# Patient Record
Sex: MALE | Race: BLACK OR AFRICAN AMERICAN | Employment: UNEMPLOYED | URBAN - METROPOLITAN AREA
[De-identification: names, ages, dates, MRNs, and addresses within clinical notes are randomized per-mention and may not be internally consistent; named-entity substitution may affect disease eponyms.]

---

## 2021-03-23 ENCOUNTER — OFFICE VISIT (OUTPATIENT)
Dept: PEDIATRICS | Facility: CLINIC | Age: 46
End: 2021-03-23
Payer: MEDICAID

## 2021-03-23 VITALS
SYSTOLIC BLOOD PRESSURE: 124 MMHG | OXYGEN SATURATION: 99 % | HEART RATE: 71 BPM | DIASTOLIC BLOOD PRESSURE: 80 MMHG | TEMPERATURE: 97.1 F | WEIGHT: 133.8 LBS | RESPIRATION RATE: 18 BRPM

## 2021-03-23 DIAGNOSIS — M25.462 EFFUSION, LEFT KNEE: ICD-10-CM

## 2021-03-23 DIAGNOSIS — M25.562 ACUTE PAIN OF LEFT KNEE: Primary | ICD-10-CM

## 2021-03-23 PROCEDURE — 99203 OFFICE O/P NEW LOW 30 MIN: CPT | Performed by: FAMILY MEDICINE

## 2021-03-23 NOTE — PROGRESS NOTES
(M25.562) Acute pain of left knee  (primary encounter diagnosis)  Comment:   He has evidence of inflammation in L knee.  Swelling and possible Bakers Cyst.    Plan: Orthopedic & Spine  Referral        Continue Naproxen.  Light activity.    (M25.462) Effusion, left knee  Comment:   Plan: Orthopedic & Spine  Referral                Melissa Stewart is a 45 year old who presents for the following health issues     HPI     Patient presents with popliteal cyst on his left knee. Patient states that he broke his RIGHT leg about three years ago and has been dealing with issues since. Patient is having a flare up in his L knee.    He notices pain and swelling L knee. Can't fully flex or extend. Limping.    He was seen at [facility]. I can't obtain the record yet in Care Everywhere.  Currently on Naproxen 375 mg bid. He also got Hydrocodone Rx. Swelling seems to have improved in last 24 hr.    He had a previous R distal tibial FX 3-2018      Review of Systems     Unremarkable except as above.        Objective    /80 (BP Location: Right arm, Patient Position: Sitting, Cuff Size: Adult Regular)   Pulse 71   Temp 97.1  F (36.2  C) (Tympanic)   Resp 18   Wt 60.7 kg (133 lb 12.8 oz)   SpO2 99%   There is no height or weight on file to calculate BMI.  Physical Exam     Thin, NAD  Non labored resp  HR nl.  Abd non distended  L leg - L knee effusion (moderate) and warmth, ROM 5-100 degrees

## 2021-03-26 ENCOUNTER — OFFICE VISIT (OUTPATIENT)
Dept: ORTHOPEDICS | Facility: CLINIC | Age: 46
End: 2021-03-26
Payer: MEDICAID

## 2021-03-26 VITALS
SYSTOLIC BLOOD PRESSURE: 132 MMHG | BODY MASS INDEX: 21.69 KG/M2 | DIASTOLIC BLOOD PRESSURE: 84 MMHG | WEIGHT: 135 LBS | HEIGHT: 66 IN

## 2021-03-26 DIAGNOSIS — M25.462 EFFUSION, LEFT KNEE: ICD-10-CM

## 2021-03-26 DIAGNOSIS — M25.562 ACUTE PAIN OF LEFT KNEE: ICD-10-CM

## 2021-03-26 DIAGNOSIS — M71.22 SYNOVIAL CYST OF LEFT POPLITEAL SPACE: Primary | ICD-10-CM

## 2021-03-26 PROCEDURE — 99203 OFFICE O/P NEW LOW 30 MIN: CPT | Performed by: ORTHOPAEDIC SURGERY

## 2021-03-26 RX ORDER — HYDROCODONE BITARTRATE AND ACETAMINOPHEN 5; 325 MG/1; MG/1
1-2 TABLET ORAL
COMMUNITY
Start: 2021-03-21

## 2021-03-26 ASSESSMENT — MIFFLIN-ST. JEOR: SCORE: 1450.11

## 2021-03-26 NOTE — PATIENT INSTRUCTIONS
Continue with icing, compression wrapping and over-the-counter anti-inflammatory medications  If pain and swelling persist beyond 3 to 4 weeks, return to clinic for consideration of aspiration and cortisone injection.

## 2021-03-26 NOTE — PROGRESS NOTES
HISTORY OF PRESENT ILLNESS:    Terry Flynn is a 45 year old male who is seen in consultation at the request of Dr. Noriega for acute left knee pain. He reports onset of knee pain 3 weeks ago. He reports no acute injury or trauma at that time. He was seen at [facility]. Patient reports last occurrence of similar symptoms 2019, at that time he reports having a left lower leg abscess.  Patient is not currently working due to Covid 19 pandemic     Present symptoms: left knee pain located within the joint, pressure of posterior, medial and lateral aspects. Sensation of swelling in the knee.  Increased pain with walking.  Patient states per referring provider his knee cap is very loose.  Increased pain with end range flexion and extension.   Current pain level: 8/10, worst pain level: 10/10  Treatments tried to this point: elevation, naproxen, hydrocodone   Orthopedic PMH: right tib-fib fracture    History reviewed. No pertinent past medical history.    History reviewed. No pertinent surgical history.    No family history on file. patient has multiple family members with Muscular Dystrophy. He has been tested in his youth does not have gene.    Social History     Socioeconomic History     Marital status: Single     Spouse name: Not on file     Number of children: Not on file     Years of education: Not on file     Highest education level: Not on file   Occupational History     Not on file   Social Needs     Financial resource strain: Not on file     Food insecurity     Worry: Not on file     Inability: Not on file     Transportation needs     Medical: Not on file     Non-medical: Not on file   Tobacco Use     Smoking status: Current Every Day Smoker     Smokeless tobacco: Never Used   Substance and Sexual Activity     Alcohol use: Yes     Comment: Social      Drug use: Never     Sexual activity: Yes   Lifestyle     Physical activity     Days per week: Not on file     Minutes per session: Not on file     Stress:  "Not on file   Relationships     Social connections     Talks on phone: Not on file     Gets together: Not on file     Attends Restorationist service: Not on file     Active member of club or organization: Not on file     Attends meetings of clubs or organizations: Not on file     Relationship status: Not on file     Intimate partner violence     Fear of current or ex partner: Not on file     Emotionally abused: Not on file     Physically abused: Not on file     Forced sexual activity: Not on file   Other Topics Concern     Not on file   Social History Narrative     Not on file       Current Outpatient Medications   Medication Sig Dispense Refill     HYDROcodone-acetaminophen (NORCO) 5-325 MG tablet Take 1-2 tablets by mouth       naproxen (NAPROSYN) 375 MG tablet Take 375 mg by mouth         No Known Allergies    REVIEW OF SYSTEMS:  CONSTITUTIONAL:  NEGATIVE for fever, chills, change in weight  INTEGUMENTARY/SKIN:  NEGATIVE for worrisome rashes, moles or lesions  EYES:  NEGATIVE for vision changes or irritation  ENT/MOUTH:  NEGATIVE for ear, mouth and throat problems  RESP:  NEGATIVE for significant cough or SOB  BREAST:  NEGATIVE for masses, tenderness or discharge  CV:  NEGATIVE for chest pain, palpitations or peripheral edema  GI:  NEGATIVE for nausea, abdominal pain, heartburn, or change in bowel habits  :  Negative   MUSCULOSKELETAL:  See HPI above  NEURO:  NEGATIVE for weakness, dizziness or paresthesias  ENDOCRINE:  NEGATIVE for temperature intolerance, skin/hair changes  HEME/ALLERGY/IMMUNE:  NEGATIVE for bleeding problems  PSYCHIATRIC:  NEGATIVE for changes in mood or affect      PHYSICAL EXAM:  /84 (BP Location: Right arm, Patient Position: Chair, Cuff Size: Adult Regular)   Ht 1.676 m (5' 6\")   Wt 61.2 kg (135 lb)   BMI 21.79 kg/m    Body mass index is 21.79 kg/m .   GENERAL APPEARANCE: healthy, alert and no distress   HEENT: No apparent thyroid megaly. Clear sclera with normal ocular " movement  RESPIRATORY: No labored breathing  SKIN: no suspicious lesions or rashes  NEURO: Normal strength and tone, mentation intact and speech normal  VASCULAR: Good pulses, and capillary refill   LYMPH: no lymphadenopathy   PSYCH:  mentation appears normal and affect normal/bright    MUSCULOSKELETAL:  Not in acute distress  Minimal limping when he walks    Left knee with mild to moderate effusion  Full range of motion  Pain with patellofemoral compression  No specific medial joint line pain  No lateral joint line pain  Intact extensor mechanism  Delon's is negative    Calf is not swollen at this point  No popliteal mass noted    Right leg with distal lateral incision from previous surgery of open reduction and internal fixation as well as anterior distal thigh incision for tibial rodding    Minimal crepitus and pain at the patellofemoral joint to right knee  Right knee without effusion     ASSESSMENT:  Left knee effusion  Early patellofemoral DJD  Ruptured Baker's cyst which is getting resolved      PLAN:  We visualized x-rays performed elsewhere on March 21, 2021.  Other than minimal squaring of the superior and inferior corners of the patella, joint spaces well-maintained.  We talked in length about the nature of early degenerative changes of the patellofemoral joint.  Most likely he has medial meniscus tear causing Baker's cyst that recently ruptured.  However, he is not particular symptomatic in the medial joint space.  Over the last 3 weeks since it happened, he has made significant improvement.  We talked about the options of further observation versus aspiration and cortisone injection.  He feels comfortable of observing the progress further.    We will reserve the option of cortisone injection with aspiration in the future if he has persisting problems beyond the next 3 to 4 weeks.    In the meantime, icing, wrapping with Ace and over-the-counter anti-inflammatory medications are recommended.  All the  questions were answered.        Imaging Interpretation:   None taken today    Serafin Bermeo MD  Department of Orthopedic Surgery        Disclaimer: This note consists of symbols derived from keyboarding, dictation and/or voice recognition software. As a result, there may be errors in the script that have gone undetected. Please consider this when interpreting information found in this chart.

## 2021-03-26 NOTE — LETTER
3/26/2021         RE: Terry Flynn  [address]  [place] [address]        Dear Colleague,    Thank you for referring your patient, Terry Flynn, to the [facility] [place]. Please see a copy of my visit note below.    HISTORY OF PRESENT ILLNESS:    Terry Flynn is a 45 year old male who is seen in consultation at the request of Dr. Noriega for acute left knee pain. He reports onset of knee pain 3 weeks ago. He reports no acute injury or trauma at that time. He was seen at [facility]. Patient reports last occurrence of similar symptoms 2019, at that time he reports having a left lower leg abscess.  Patient is not currently working due to Covid 19 pandemic     Present symptoms: left knee pain located within the joint, pressure of posterior, medial and lateral aspects. Sensation of swelling in the knee.  Increased pain with walking.  Patient states per referring provider his knee cap is very loose.  Increased pain with end range flexion and extension.   Current pain level: 8/10, worst pain level: 10/10  Treatments tried to this point: elevation, naproxen, hydrocodone   Orthopedic PMH: right tib-fib fracture    History reviewed. No pertinent past medical history.    History reviewed. No pertinent surgical history.    No family history on file. patient has multiple family members with Muscular Dystrophy. He has been tested in his youth does not have gene.    Social History     Socioeconomic History     Marital status: Single     Spouse name: Not on file     Number of children: Not on file     Years of education: Not on file     Highest education level: Not on file   Occupational History     Not on file   Social Needs     Financial resource strain: Not on file     Food insecurity     Worry: Not on file     Inability: Not on file     Transportation needs     Medical: Not on file     Non-medical: Not on file   Tobacco Use     Smoking status:  Current Every Day Smoker     Smokeless tobacco: Never Used   Substance and Sexual Activity     Alcohol use: Yes     Comment: Social      Drug use: Never     Sexual activity: Yes   Lifestyle     Physical activity     Days per week: Not on file     Minutes per session: Not on file     Stress: Not on file   Relationships     Social connections     Talks on phone: Not on file     Gets together: Not on file     Attends Mandaeism service: Not on file     Active member of club or organization: Not on file     Attends meetings of clubs or organizations: Not on file     Relationship status: Not on file     Intimate partner violence     Fear of current or ex partner: Not on file     Emotionally abused: Not on file     Physically abused: Not on file     Forced sexual activity: Not on file   Other Topics Concern     Not on file   Social History Narrative     Not on file       Current Outpatient Medications   Medication Sig Dispense Refill     HYDROcodone-acetaminophen (NORCO) 5-325 MG tablet Take 1-2 tablets by mouth       naproxen (NAPROSYN) 375 MG tablet Take 375 mg by mouth         No Known Allergies    REVIEW OF SYSTEMS:  CONSTITUTIONAL:  NEGATIVE for fever, chills, change in weight  INTEGUMENTARY/SKIN:  NEGATIVE for worrisome rashes, moles or lesions  EYES:  NEGATIVE for vision changes or irritation  ENT/MOUTH:  NEGATIVE for ear, mouth and throat problems  RESP:  NEGATIVE for significant cough or SOB  BREAST:  NEGATIVE for masses, tenderness or discharge  CV:  NEGATIVE for chest pain, palpitations or peripheral edema  GI:  NEGATIVE for nausea, abdominal pain, heartburn, or change in bowel habits  :  Negative   MUSCULOSKELETAL:  See HPI above  NEURO:  NEGATIVE for weakness, dizziness or paresthesias  ENDOCRINE:  NEGATIVE for temperature intolerance, skin/hair changes  HEME/ALLERGY/IMMUNE:  NEGATIVE for bleeding problems  PSYCHIATRIC:  NEGATIVE for changes in mood or affect      PHYSICAL EXAM:  /84 (BP Location:  "Right arm, Patient Position: Chair, Cuff Size: Adult Regular)   Ht 1.676 m (5' 6\")   Wt 61.2 kg (135 lb)   BMI 21.79 kg/m    Body mass index is 21.79 kg/m .   GENERAL APPEARANCE: healthy, alert and no distress   HEENT: No apparent thyroid megaly. Clear sclera with normal ocular movement  RESPIRATORY: No labored breathing  SKIN: no suspicious lesions or rashes  NEURO: Normal strength and tone, mentation intact and speech normal  VASCULAR: Good pulses, and capillary refill   LYMPH: no lymphadenopathy   PSYCH:  mentation appears normal and affect normal/bright    MUSCULOSKELETAL:  Not in acute distress  Minimal limping when he walks    Left knee with mild to moderate effusion  Full range of motion  Pain with patellofemoral compression  No specific medial joint line pain  No lateral joint line pain  Intact extensor mechanism  Delon's is negative    Calf is not swollen at this point  No popliteal mass noted    Right leg with distal lateral incision from previous surgery of open reduction and internal fixation as well as anterior distal thigh incision for tibial rodding    Minimal crepitus and pain at the patellofemoral joint to right knee  Right knee without effusion     ASSESSMENT:  Left knee effusion  Early patellofemoral DJD  Ruptured Baker's cyst which is getting resolved      PLAN:  We visualized x-rays performed elsewhere on March 21, 2021.  Other than minimal squaring of the superior and inferior corners of the patella, joint spaces well-maintained.  We talked in length about the nature of early degenerative changes of the patellofemoral joint.  Most likely he has medial meniscus tear causing Baker's cyst that recently ruptured.  However, he is not particular symptomatic in the medial joint space.  Over the last 3 weeks since it happened, he has made significant improvement.  We talked about the options of further observation versus aspiration and cortisone injection.  He feels comfortable of observing the " progress further.    We will reserve the option of cortisone injection with aspiration in the future if he has persisting problems beyond the next 3 to 4 weeks.    In the meantime, icing, wrapping with Ace and over-the-counter anti-inflammatory medications are recommended.  All the questions were answered.        Imaging Interpretation:   None taken today    Serafin Bermeo MD  Department of Orthopedic Surgery        Disclaimer: This note consists of symbols derived from keyboarding, dictation and/or voice recognition software. As a result, there may be errors in the script that have gone undetected. Please consider this when interpreting information found in this chart.        Again, thank you for allowing me to participate in the care of your patient.        Sincerely,        Serafin Bermeo MD